# Patient Record
Sex: FEMALE
[De-identification: names, ages, dates, MRNs, and addresses within clinical notes are randomized per-mention and may not be internally consistent; named-entity substitution may affect disease eponyms.]

---

## 2021-03-29 ENCOUNTER — RX ONLY (RX ONLY)
Age: 26
End: 2021-03-29

## 2021-03-29 ENCOUNTER — RASH (OUTPATIENT)
Dept: URBAN - METROPOLITAN AREA CLINIC 31 | Facility: CLINIC | Age: 26
Setting detail: DERMATOLOGY
End: 2021-03-29

## 2021-03-29 DIAGNOSIS — L90.5 SCAR CONDITIONS AND FIBROSIS OF SKIN: ICD-10-CM

## 2021-03-29 DIAGNOSIS — L70.0 ACNE VULGARIS: ICD-10-CM

## 2021-03-29 PROBLEM — L71.0 PERIORAL DERMATITIS: Status: RESOLVED | Noted: 2021-03-29

## 2021-03-29 PROBLEM — L71.0 PERIORAL DERMATITIS: Status: ACTIVE | Noted: 2021-03-29

## 2021-03-29 PROCEDURE — 99204 OFFICE O/P NEW MOD 45 MIN: CPT

## 2021-04-28 ENCOUNTER — RX ONLY (RX ONLY)
Age: 26
End: 2021-04-28

## 2021-04-28 RX ORDER — METRONIDAZOLE 10 MG/G
1 A SMALL AMOUNT GEL TOPICAL ONCE A DAY
Qty: 60 | Refills: 3
Start: 2021-04-28

## 2021-08-09 ENCOUNTER — RASH (OUTPATIENT)
Dept: URBAN - METROPOLITAN AREA CLINIC 31 | Facility: CLINIC | Age: 26
Setting detail: DERMATOLOGY
End: 2021-08-09

## 2021-08-09 ENCOUNTER — RX ONLY (RX ONLY)
Age: 26
End: 2021-08-09

## 2021-08-09 DIAGNOSIS — L72.3 SEBACEOUS CYST: ICD-10-CM

## 2021-08-09 DIAGNOSIS — D22.61 MELANOCYTIC NEVI OF RIGHT UPPER LIMB, INCLUDING SHOULDER: ICD-10-CM

## 2021-08-09 DIAGNOSIS — B07.8 OTHER VIRAL WARTS: ICD-10-CM

## 2021-08-09 PROCEDURE — 99213 OFFICE O/P EST LOW 20 MIN: CPT

## 2021-08-09 RX ORDER — HYDROCORTISONE BUTYRATE 1 MG/G
1 A SMALL AMOUNT CREAM TOPICAL TWICE A DAY
Qty: 45 | Refills: 0
Start: 2021-08-09

## 2021-11-30 ENCOUNTER — SEE NOTE (OUTPATIENT)
Dept: URBAN - METROPOLITAN AREA CLINIC 36 | Facility: CLINIC | Age: 26
Setting detail: DERMATOLOGY
End: 2021-11-30

## 2021-11-30 DIAGNOSIS — L82.0 INFLAMED SEBORRHEIC KERATOSIS: ICD-10-CM

## 2021-11-30 PROCEDURE — OTHER RESTYLANE KYSSE: OTHER

## 2021-12-15 ENCOUNTER — *BOTOX/DYSPORT TOUCH-UP (OUTPATIENT)
Dept: URBAN - METROPOLITAN AREA CLINIC 36 | Facility: CLINIC | Age: 26
Setting detail: DERMATOLOGY
End: 2021-12-15

## 2021-12-15 DIAGNOSIS — D48.5 NEOPLASM OF UNCERTAIN BEHAVIOR OF SKIN: ICD-10-CM

## 2021-12-15 PROCEDURE — OTHER RESTYLANE - TOUCH-UP: OTHER

## 2022-01-24 ENCOUNTER — *FILLER (OUTPATIENT)
Dept: URBAN - METROPOLITAN AREA CLINIC 36 | Facility: CLINIC | Age: 27
Setting detail: DERMATOLOGY
End: 2022-01-24

## 2022-01-24 DIAGNOSIS — Z41.9 ENCOUNTER FOR PROCEDURE FOR PURPOSES OTHER THAN REMEDYING HEALTH STATE, UNSPECIFIED: ICD-10-CM

## 2022-01-24 PROCEDURE — OTHER RESTYLANE - TOUCH-UP: OTHER

## 2022-01-24 PROCEDURE — OTHER COSMETIC TREATMENT: OTHER

## 2022-08-08 ENCOUNTER — APPOINTMENT (OUTPATIENT)
Dept: URBAN - METROPOLITAN AREA CLINIC 206 | Age: 27
Setting detail: DERMATOLOGY
End: 2022-08-11

## 2022-08-08 DIAGNOSIS — L71.0 PERIORAL DERMATITIS: ICD-10-CM

## 2022-08-08 PROCEDURE — OTHER PRESCRIPTION MEDICATION MANAGEMENT: OTHER

## 2022-08-08 PROCEDURE — OTHER COUNSELING: OTHER

## 2022-08-08 PROCEDURE — OTHER PRESCRIPTION: OTHER

## 2022-08-08 PROCEDURE — 99214 OFFICE O/P EST MOD 30 MIN: CPT

## 2022-08-08 PROCEDURE — OTHER OTHER: OTHER

## 2022-08-08 RX ORDER — METRONIDAZOLE 7.5 MG/G
GEL TOPICAL
Qty: 45 | Refills: 1 | Status: ERX | COMMUNITY
Start: 2022-08-08

## 2022-08-08 RX ORDER — CLINDAMYCIN PHOSPHATE 10 MG/G
GEL TOPICAL
Qty: 60 | Refills: 0 | Status: ERX | COMMUNITY
Start: 2022-08-08

## 2022-08-08 ASSESSMENT — LOCATION SIMPLE DESCRIPTION DERM
LOCATION SIMPLE: RIGHT CHEEK
LOCATION SIMPLE: RIGHT LIP
LOCATION SIMPLE: LEFT CHEEK
LOCATION SIMPLE: LEFT LIP

## 2022-08-08 ASSESSMENT — LOCATION DETAILED DESCRIPTION DERM
LOCATION DETAILED: RIGHT INFERIOR MEDIAL MALAR CHEEK
LOCATION DETAILED: RIGHT SUPERIOR CENTRAL MALAR CHEEK
LOCATION DETAILED: RIGHT LOWER CUTANEOUS LIP
LOCATION DETAILED: LEFT LOWER CUTANEOUS LIP
LOCATION DETAILED: LEFT INFERIOR MEDIAL MALAR CHEEK

## 2022-08-08 ASSESSMENT — LOCATION ZONE DERM
LOCATION ZONE: LIP
LOCATION ZONE: FACE

## 2022-08-08 NOTE — PROCEDURE: OTHER
Note Text (......Xxx Chief Complaint.): This diagnosis correlates with the
Other (Free Text): -discussed oral with topicals vs just topicals. Advised oral medications will help speed up process of flare. Patient opted against orals at this time
Detail Level: Zone
Render Risk Assessment In Note?: no

## 2022-08-08 NOTE — HPI: RASH
How Severe Is Your Rash?: moderate
Is This A New Presentation, Or A Follow-Up?: Follow Up Rash
Additional History: Patient believes it is the same concern as last year. Maybe bumps under eyes are all related. Started a new topical face wash that has lemon it in perhaps related to new breakout

## 2022-08-08 NOTE — PROCEDURE: PRESCRIPTION MEDICATION MANAGEMENT
Discontinue Regimen: -botanical or organic based products on face
Initiate Treatment: -Cerave or Cetaphil cleansers and moisturizers
Continue Regimen: -we discussed Doxycycline. Not taking oral medications will further the condition. You have opted out of it\\n-Clindamycin gel in the morning for 2 months \\n-Metronidazole gel at night time for 2 months
Detail Level: Zone
Render In Strict Bullet Format?: No
Plan: -chronic but benign recurrent condition

## 2022-08-16 ENCOUNTER — RX ONLY (RX ONLY)
Age: 27
End: 2022-08-16

## 2022-08-16 RX ORDER — DOXYCYCLINE HYCLATE 100 MG/1
TABLET, COATED ORAL
Qty: 60 | Refills: 0 | Status: ERX | COMMUNITY
Start: 2022-08-16

## 2023-03-23 ENCOUNTER — APPOINTMENT (OUTPATIENT)
Dept: URBAN - METROPOLITAN AREA CLINIC 206 | Age: 28
Setting detail: DERMATOLOGY
End: 2023-03-25

## 2023-03-23 DIAGNOSIS — L71.0 PERIORAL DERMATITIS: ICD-10-CM

## 2023-03-23 DIAGNOSIS — L70.0 ACNE VULGARIS: ICD-10-CM

## 2023-03-23 PROCEDURE — OTHER PRESCRIPTION MEDICATION MANAGEMENT: OTHER

## 2023-03-23 PROCEDURE — 99214 OFFICE O/P EST MOD 30 MIN: CPT

## 2023-03-23 PROCEDURE — OTHER COUNSELING: OTHER

## 2023-03-23 PROCEDURE — OTHER PRESCRIPTION: OTHER

## 2023-03-23 PROCEDURE — OTHER MIPS QUALITY: OTHER

## 2023-03-23 RX ORDER — DOXYCYCLINE HYCLATE 100 MG/1
TABLET, COATED ORAL
Qty: 60 | Refills: 0 | Status: ERX

## 2023-03-23 RX ORDER — CLINDAMYCIN PHOSPHATE AND TRETINOIN 12; .25 MG/G; MG/G
GEL TOPICAL
Qty: 30 | Refills: 3 | Status: ERX | COMMUNITY
Start: 2023-03-23

## 2023-03-23 RX ORDER — CLASCOTERONE 1 G/100G
CREAM TOPICAL
Qty: 60 | Refills: 3 | Status: ERX | COMMUNITY
Start: 2023-03-23

## 2023-03-23 RX ORDER — METRONIDAZOLE 7.5 MG/G
GEL TOPICAL
Qty: 45 | Refills: 3 | Status: ERX

## 2023-03-23 RX ORDER — CLINDAMYCIN PHOSPHATE 10 MG/G
GEL TOPICAL
Qty: 60 | Refills: 3 | Status: ERX

## 2023-03-23 ASSESSMENT — LOCATION DETAILED DESCRIPTION DERM
LOCATION DETAILED: LEFT SUPERIOR MEDIAL BUCCAL CHEEK
LOCATION DETAILED: LEFT INFERIOR CENTRAL MALAR CHEEK
LOCATION DETAILED: LEFT LOWER CUTANEOUS LIP
LOCATION DETAILED: RIGHT INFERIOR CENTRAL MALAR CHEEK
LOCATION DETAILED: RIGHT LOWER CUTANEOUS LIP

## 2023-03-23 ASSESSMENT — LOCATION SIMPLE DESCRIPTION DERM
LOCATION SIMPLE: RIGHT LIP
LOCATION SIMPLE: LEFT CHEEK
LOCATION SIMPLE: LEFT LIP
LOCATION SIMPLE: RIGHT CHEEK

## 2023-03-23 ASSESSMENT — LOCATION ZONE DERM
LOCATION ZONE: FACE
LOCATION ZONE: LIP

## 2023-03-23 NOTE — PROCEDURE: COUNSELING
Detail Level: Simple
Isotretinoin Counseling: Patient advised medication is a high dose of Vitamin A and should be taken with food for best absorption. No other Vitamin A supplements should be taken. Monthly visits are required for refills of medication and expect the course to be an average of 6 months. Female patients are required to have 2 forms of contraceptive. Side effects reviewed, but not limited to, dryness and joint pain. Instructions on how to combat side effects with OTC products reviewed in visit today. Pt to contact office should depression,suicidal/homicidal thoughts, broken bone occur. Avoid the following while on the medication and 30 days after completion: pregnancy, tetracycline antibiotics, tylenol, alcohol, donating blood, ablative lasers, tattoos/piercings. Notify your provider if you have a surgery scheduled while on the medication. If you have a history of crohns, ulcerative colitis or depression it is recommended you let your provider know prior to starting therapy.
Topical Retinoid counseling:  Patient advised to apply a pea-sized amount only at bedtime. Patient may be asked to titrate up to nightly by your provider which means to only use 3 nights/week and increase as it becomes tolerable. It is recommended to apply a non-comedogenic moisturizer such as Cetaphil or Cerave after your topical retinoid application. If too drying, patient may moisturize both before and after application. The patient verbalized understanding of the proper use and possible adverse effects of retinoids.  All of the patient's questions and concerns were addressed.
Spironolactone Pregnancy And Lactation Text: This medication can cause feminization of the male fetus and should be avoided during pregnancy. The active metabolite is also found in breast milk.
Topical Clindamycin Pregnancy And Lactation Text: This medication is Pregnancy Category B and is considered safe during pregnancy. It is unknown if it is excreted in breast milk.
Minocycline Counseling: Patient advised regarding possible photosensitivity and discoloration of the teeth, skin, lips, tongue and gums.  Patient instructed to avoid sunlight, if possible.  When exposed to sunlight, patients should wear protective clothing, sunglasses, and sunscreen.  The patient was instructed to call the office immediately if the following severe adverse effects occur:  hearing changes, easy bruising/bleeding, severe headache, or vision changes.  The patient verbalized understanding of the proper use and possible adverse effects of minocycline.  All of the patient's questions and concerns were addressed.
Aklief Pregnancy And Lactation Text: It is unknown if this medication is safe to use during pregnancy.  It is unknown if this medication is excreted in breast milk.  Breastfeeding women should use the topical cream on the smallest area of the skin for the shortest time needed while breastfeeding.  Do not apply to nipple and areola.
Dapsone Pregnancy And Lactation Text: This medication is Pregnancy Category C and is not considered safe during pregnancy or breast feeding.
Erythromycin Pregnancy And Lactation Text: This medication is Pregnancy Category B and is considered safe during pregnancy. It is also excreted in breast milk.
Topical Retinoid Pregnancy And Lactation Text: This medication is Pregnancy Category C. It is unknown if this medication is excreted in breast milk.
Spironolactone Counseling: Patient advised regarding risks of birth defects (for female patients), dizziness, menstrual irregularities, breast tenderness, hyperkalemia. The patient may need blood work to monitor liver and kidney function and potassium levels while on therapy if a higher dose is prescribed. The patient was instructed to discontinue medication or increase water intake (>64 oz) if dizziness occurs. It may be recommended for you to titrate up with your dose of Spironolactone to attempt to avoid the side effects. If patient plans to get pregnant or becomes pregnant while on medication, it is advised to stop the medication and contact the office. The patient verbalized understanding of the proper use and possible adverse effects of spironolactone.  All of the patient's questions and concerns were addressed.
Bactrim Counseling:  I discussed with the patient the risks of sulfa antibiotics including but not limited to GI upset, allergic reaction, drug rash, diarrhea, dizziness, photosensitivity, and yeast infections.  Rarely, more serious reactions can occur including but not limited to aplastic anemia, agranulocytosis, methemoglobinemia, blood dyscrasias, liver or kidney failure, lung infiltrates or desquamative/blistering drug rashes.
Sunscreen Recommendations: Cerave AM Facial Moisturizing Lotion SPF 30\\nElta MD UV Clear Broad Spectrum SPF 46 for acne prone skin
Aklief counseling:  Patient advised to apply a pea-sized amount only at bedtime. If too drying, patient may add a non-comedogenic moisturizer.  The most commonly reported side effects including irritation, redness, scaling, dryness, stinging, burning, itching, and increased risk of sunburn.  The patient verbalized understanding of the proper use and possible adverse effects of retinoids.  All of the patient's questions and concerns were addressed.
Dapsone Counseling: I discussed with the patient the risks of dapsone including but not limited to hemolytic anemia, agranulocytosis, rashes, methemoglobinemia, kidney failure, peripheral neuropathy, headaches, GI upset, and liver toxicity.  Patients who start dapsone require monitoring including baseline LFTs and weekly CBCs for the first month, then every month thereafter.  The patient verbalized understanding of the proper use and possible adverse effects of dapsone.  All of the patient's questions and concerns were addressed.
Topical Sulfur Applications Counseling: Topical Sulfur Counseling: Patient counseled that this medication may cause skin irritation or allergic reactions.  In the event of skin irritation, the patient was advised to reduce the amount of the drug applied or use it less frequently.   The patient verbalized understanding of the proper use and possible adverse effects of topical sulfur application.  All of the patient's questions and concerns were addressed.
Include Pregnancy/Lactation Warning?: No
High Dose Vitamin A Pregnancy And Lactation Text: High dose vitamin A therapy is contraindicated during pregnancy and breast feeding.
Bpo Recommendations: OTC Panoxyl
Erythromycin Counseling:  I discussed with the patient the risks of erythromycin including but not limited to GI upset, allergic reaction, drug rash, diarrhea, increase in liver enzymes, and yeast infections.
Tazorac Counseling:  Patient advised that medication is irritating and drying.  Patient may need to apply sparingly and wash off after an hour before eventually leaving it on overnight.  The patient verbalized understanding of the proper use and possible adverse effects of tazorac.  All of the patient's questions and concerns were addressed.
Sarecycline Pregnancy And Lactation Text: This medication is Pregnancy Category D and not consider safe during pregnancy. It is also excreted in breast milk.
Azithromycin Pregnancy And Lactation Text: This medication is considered safe during pregnancy and is also secreted in breast milk.
Benzoyl Peroxide Counseling: Patient counseled that medicine may cause skin irritation and bleach clothing.  In the event of skin irritation, the patient was advised to reduce the amount of the drug applied or use it less frequently.   The patient verbalized understanding of the proper use and possible adverse effects of benzoyl peroxide.  All of the patient's questions and concerns were addressed.
Birth Control Pills Pregnancy And Lactation Text: This medication should be avoided if pregnant and for the first 30 days post-partum.
Topical Sulfur Applications Pregnancy And Lactation Text: This medication is Pregnancy Category C and has an unknown safety profile during pregnancy. It is unknown if this topical medication is excreted in breast milk.
High Dose Vitamin A Counseling: Side effects reviewed, pt to contact office should one occur.
Cleanser Recommendations: Cetaphil Gentle Cleanser\\nCerave Facial Cleanser
Doxycycline Pregnancy And Lactation Text: This medication is Pregnancy Category D and not consider safe during pregnancy. It is also excreted in breast milk but is considered safe for shorter treatment courses.
Tazorac Pregnancy And Lactation Text: This medication is not safe during pregnancy. It is unknown if this medication is excreted in breast milk.
Sarecycline Counseling: Patient advised regarding possible photosensitivity and discoloration of the teeth, skin, lips, tongue and gums.  Patient instructed to avoid sunlight, if possible.  When exposed to sunlight, patients should wear protective clothing, sunglasses, and sunscreen.  The patient was instructed to call the office immediately if the following severe adverse effects occur:  hearing changes, easy bruising/bleeding, severe headache, or vision changes.  The patient verbalized understanding of the proper use and possible adverse effects of sarecycline.  All of the patient's questions and concerns were addressed.
Azithromycin Counseling:  I discussed with the patient the risks of azithromycin including but not limited to GI upset, allergic reaction, drug rash, diarrhea, and yeast infections.
Azelaic Acid Pregnancy And Lactation Text: This medication is considered safe during pregnancy and breast feeding.
Benzoyl Peroxide Pregnancy And Lactation Text: This medication is Pregnancy Category C. It is unknown if benzoyl peroxide is excreted in breast milk.
Use Enhanced Medication Counseling?: Yes
Birth Control Pills Counseling: Birth Control Pill Counseling: I discussed with the patient the potential side effects of OCPs including but not limited to increased risk of stroke, heart attack, thrombophlebitis, deep venous thrombosis, hepatic adenomas, breast changes, GI upset, headaches, and depression.  The patient verbalized understanding of the proper use and possible adverse effects of OCPs. All of the patient's questions and concerns were addressed. The 3 OCPs recommended to you for acne include: MARISELA, Orthotricyclen and Sprintec.
Winlevi Counseling:  I discussed with the patient the risks of topical clascoterone including but not limited to erythema, scaling, itching, and stinging. Patient voiced their understanding.
Isotretinoin Pregnancy And Lactation Text: This medication is Pregnancy Category X and is considered extremely dangerous during pregnancy. It is unknown if it is excreted in breast milk.
Moisturizer Recommendations: Cetaphil Moisturizing Lotion\\nCerave Moisturizing Lotion
Tetracycline Counseling: Patient counseled regarding possible photosensitivity and increased risk for sunburn.  Patient instructed to avoid sunlight, if possible.  When exposed to sunlight, patients should wear protective clothing, sunglasses, and sunscreen.  The patient was instructed to call the office immediately if the following severe adverse effects occur:  hearing changes, easy bruising/bleeding, severe headache, or vision changes.  The patient verbalized understanding of the proper use and possible adverse effects of tetracycline.  All of the patient's questions and concerns were addressed. Patient understands to avoid pregnancy while on therapy due to potential birth defects.
Topical Clindamycin Counseling: Patient counseled that this medication may cause skin irritation or allergic reactions.  In the event of skin irritation, the patient was advised to reduce the amount of the drug applied or use it less frequently.   The patient verbalized understanding of the proper use and possible adverse effects of clindamycin.  All of the patient's questions and concerns were addressed.
Azelaic Acid Counseling: Patient counseled that medicine may cause skin irritation and to avoid applying near the eyes.  In the event of skin irritation, the patient was advised to reduce the amount of the drug applied or use it less frequently.   The patient verbalized understanding of the proper use and possible adverse effects of azelaic acid.  All of the patient's questions and concerns were addressed.
Doxycycline Counseling:  Patient counseled regarding possible photosensitivity and increased risk for sunburn.  Advised the patient to take dosage at least 30 min before laying down, and always take with a full meal and large glass of water.  Patient instructed to avoid sunlight, if possible.  When exposed to sunlight, patients should wear protective clothing, sunglasses, and sunscreen.  The patient was instructed to call the office immediately if the following severe adverse effects occur:  hearing changes, easy bruising/bleeding, severe headache, or vision changes.  The patient verbalized understanding of the proper use and possible adverse effects of doxycycline.  All of the patient's questions and concerns were addressed.
Bactrim Pregnancy And Lactation Text: This medication is Pregnancy Category D and is known to cause fetal risk.  It is also excreted in breast milk.
Winlevi Pregnancy And Lactation Text: This medication is considered safe during pregnancy and breastfeeding.

## 2023-03-23 NOTE — PROCEDURE: PRESCRIPTION MEDICATION MANAGEMENT
Render In Strict Bullet Format?: Yes
Detail Level: Zone
Initiate Treatment: -Metronidazole gel .75% apply thin film to face at night time\\n-Clindamycin gel apply thin film to face in the morning\\n-Doxycycline 100mg take one pill once daily until pills are done. You can take one pill in the morning and one pill at night time for the next 7 days if you prefer
Plan: -we discussed adding Spironolactone oral medication \\n-IUD can make acne worse \\n\\n***if any medication is too expensive please call or text us***
Initiate Treatment: -Winlevi apply thin film to face every morning \\n-Clindamycin/Tretinoin apply pea size amount to face nightly. DO NOT START UNTIL CURRENT RASH IS GONE

## 2023-03-23 NOTE — HPI: RASH (PERIORAL DERMATITIS)
How Severe Is It?: moderate
Is This A New Presentation, Or A Follow-Up?: Follow Up Perioral Dermatitis
Additional History: Pt states her POD is recurring and would like to also treat her hormonal acne, prefers no acne pills but would like to start a topical Tretinoin while treating POD

## 2023-03-27 ENCOUNTER — RX ONLY (RX ONLY)
Age: 28
End: 2023-03-27

## 2023-03-27 RX ORDER — CLASCOTERONE 1 G/100G
CREAM TOPICAL
Qty: 60 | Refills: 3 | Status: ERX

## 2023-06-08 ENCOUNTER — APPOINTMENT (OUTPATIENT)
Dept: URBAN - METROPOLITAN AREA CLINIC 207 | Age: 28
Setting detail: DERMATOLOGY
End: 2023-06-08

## 2023-06-08 DIAGNOSIS — L71.0 PERIORAL DERMATITIS: ICD-10-CM

## 2023-06-08 PROBLEM — R68.89 OTHER GENERAL SYMPTOMS AND SIGNS: Status: ACTIVE | Noted: 2023-06-08

## 2023-06-08 PROCEDURE — OTHER PATIENT SPECIFIC COUNSELING: OTHER

## 2023-06-08 PROCEDURE — OTHER ORDER TESTS: OTHER

## 2023-06-08 PROCEDURE — OTHER COUNSELING: OTHER

## 2023-06-08 PROCEDURE — OTHER MIPS QUALITY: OTHER

## 2023-06-08 PROCEDURE — OTHER PRESCRIPTION: OTHER

## 2023-06-08 PROCEDURE — 99214 OFFICE O/P EST MOD 30 MIN: CPT

## 2023-06-08 PROCEDURE — OTHER PRESCRIPTION MEDICATION MANAGEMENT: OTHER

## 2023-06-08 PROCEDURE — OTHER ADDITIONAL NOTES: OTHER

## 2023-06-08 PROCEDURE — OTHER MEDICATION COUNSELING: OTHER

## 2023-06-08 RX ORDER — DOXYCYCLINE HYCLATE 100 MG/1
CAPSULE, GELATIN COATED ORAL
Qty: 60 | Refills: 0 | Status: ERX | COMMUNITY
Start: 2023-06-08

## 2023-06-08 RX ORDER — PIMECROLIMUS 10 MG/G
CREAM TOPICAL TWICE DAILY
Qty: 60 | Refills: 2 | Status: ERX | COMMUNITY
Start: 2023-06-08

## 2023-06-08 ASSESSMENT — LOCATION DETAILED DESCRIPTION DERM
LOCATION DETAILED: LEFT INFERIOR MEDIAL MALAR CHEEK
LOCATION DETAILED: RIGHT CENTRAL BUCCAL CHEEK
LOCATION DETAILED: LEFT CENTRAL BUCCAL CHEEK

## 2023-06-08 ASSESSMENT — LOCATION SIMPLE DESCRIPTION DERM
LOCATION SIMPLE: RIGHT CHEEK
LOCATION SIMPLE: LEFT CHEEK

## 2023-06-08 ASSESSMENT — LOCATION ZONE DERM: LOCATION ZONE: FACE

## 2023-06-08 NOTE — PROCEDURE: PRESCRIPTION MEDICATION MANAGEMENT
Continue Regimen: Metronidazole 1% Gel-apply QAM (advised to continue a few weeks after the 2 months of Doxycycline)
Render In Strict Bullet Format?: No
Detail Level: Zone
Initiate Treatment: doxycycline hyclate 100 mg capsule \\nQuantity: 60.0 Capsule  Days Supply: 60\\nSig: Take one capsule twice a day x 1 week, then decrease to one capsule daily, take with a meal and 8 oz of water; sit upright for 30 minutes after taking\\n\\nElidel 1 % topical cream Twice daily\\nApply a thin layer to face at night, apply until rash is clear, then an  additional 2 weeks
Discontinue Regimen: Clindamycin 1% Gel

## 2023-06-08 NOTE — PROCEDURE: ORDER TESTS
Bill For Surgical Tray: no
Billing Type: Third-Party Bill
Performing Laboratory: [id number]
Expected Date Of Service: 06/08/2023
Clinical Notes (To The Lab): Right/Left Nasal

## 2023-06-08 NOTE — PROCEDURE: ADDITIONAL NOTES
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 She recentlu had a fruit that she believes she is allergic to and broke out again. Discussed PoD as waxing and waning and sometimes idiopathic.
Render Risk Assessment In Note?: no
Detail Level: Simple

## 2023-06-08 NOTE — PROCEDURE: MEDICATION COUNSELING
Xelmarlonz Pregnancy And Lactation Text: This medication is Pregnancy Category D and is not considered safe during pregnancy.  The risk during breast feeding is also uncertain.

## 2024-04-01 ENCOUNTER — RX ONLY (RX ONLY)
Age: 29
End: 2024-04-01

## 2024-04-01 RX ORDER — PIMECROLIMUS 10 MG/G
CREAM TOPICAL TWICE DAILY
Qty: 30 | Refills: 0 | Status: ERX

## 2024-04-01 RX ORDER — DOXYCYCLINE HYCLATE 100 MG/1
CAPSULE, GELATIN COATED ORAL
Qty: 60 | Refills: 0 | Status: ERX

## 2024-04-01 RX ORDER — METRONIDAZOLE 7.5 MG/G
GEL TOPICAL
Qty: 45 | Refills: 3 | Status: ERX